# Patient Record
Sex: FEMALE | Race: WHITE | NOT HISPANIC OR LATINO
[De-identification: names, ages, dates, MRNs, and addresses within clinical notes are randomized per-mention and may not be internally consistent; named-entity substitution may affect disease eponyms.]

---
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
## 2023-03-28 ENCOUNTER — APPOINTMENT (OUTPATIENT)
Dept: PEDIATRICS | Facility: CLINIC | Age: 18
End: 2023-03-28
Payer: COMMERCIAL

## 2023-03-28 VITALS
BODY MASS INDEX: 23.81 KG/M2 | OXYGEN SATURATION: 98 % | WEIGHT: 141.2 LBS | DIASTOLIC BLOOD PRESSURE: 66 MMHG | HEIGHT: 64.75 IN | HEART RATE: 85 BPM | SYSTOLIC BLOOD PRESSURE: 104 MMHG

## 2023-03-28 DIAGNOSIS — Z13.29 ENCOUNTER FOR SCREENING FOR OTHER SUSPECTED ENDOCRINE DISORDER: ICD-10-CM

## 2023-03-28 DIAGNOSIS — Z00.00 ENCOUNTER FOR GENERAL ADULT MEDICAL EXAMINATION W/OUT ABNORMAL FINDINGS: ICD-10-CM

## 2023-03-28 DIAGNOSIS — J45.909 UNSPECIFIED ASTHMA, UNCOMPLICATED: ICD-10-CM

## 2023-03-28 PROCEDURE — 96127 BRIEF EMOTIONAL/BEHAV ASSMT: CPT

## 2023-03-28 PROCEDURE — 99173 VISUAL ACUITY SCREEN: CPT | Mod: 59

## 2023-03-28 PROCEDURE — 92551 PURE TONE HEARING TEST AIR: CPT

## 2023-03-28 PROCEDURE — 99395 PREV VISIT EST AGE 18-39: CPT | Mod: 25

## 2023-03-28 PROCEDURE — 96160 PT-FOCUSED HLTH RISK ASSMT: CPT | Mod: 59

## 2023-03-28 RX ORDER — FLUTICASONE PROPIONATE 44 UG/1
44 AEROSOL, METERED RESPIRATORY (INHALATION)
Qty: 1 | Refills: 2 | Status: ACTIVE | COMMUNITY
Start: 2023-03-28 | End: 1900-01-01

## 2023-03-28 RX ORDER — ALBUTEROL SULFATE 2.5 MG/3ML
(2.5 MG/3ML) SOLUTION RESPIRATORY (INHALATION)
Qty: 2 | Refills: 1 | Status: ACTIVE | COMMUNITY
Start: 2023-03-28 | End: 1900-01-01

## 2023-03-28 RX ORDER — ALBUTEROL SULFATE 90 UG/1
108 (90 BASE) INHALANT RESPIRATORY (INHALATION)
Qty: 1 | Refills: 2 | Status: ACTIVE | COMMUNITY
Start: 2023-03-28 | End: 1900-01-01

## 2023-03-29 PROBLEM — Z00.00 ENCOUNTER FOR PREVENTIVE HEALTH EXAMINATION: Status: ACTIVE | Noted: 2019-08-13

## 2023-03-29 PROBLEM — J45.909 ASTHMA: Status: ACTIVE | Noted: 2019-10-06

## 2023-03-29 RX ORDER — FLUTICASONE PROPIONATE 50 UG/1
50 SPRAY, METERED NASAL TWICE DAILY
Qty: 1 | Refills: 2 | Status: COMPLETED | COMMUNITY
Start: 2019-10-06 | End: 2023-03-29

## 2023-03-29 RX ORDER — DESOGESTREL AND ETHINYL ESTRADIOL 0.15-0.03
0.15-3 KIT ORAL
Qty: 28 | Refills: 0 | Status: COMPLETED | COMMUNITY
Start: 2019-05-28 | End: 2023-03-29

## 2023-03-29 RX ORDER — TRETINOIN 0.25 MG/G
0.03 CREAM TOPICAL
Qty: 45 | Refills: 0 | Status: COMPLETED | COMMUNITY
Start: 2019-01-22 | End: 2023-03-29

## 2023-03-29 RX ORDER — ALBUTEROL SULFATE 108 UG/1
108 (90 BASE) AEROSOL, METERED RESPIRATORY (INHALATION)
Qty: 1 | Refills: 2 | Status: COMPLETED | COMMUNITY
Start: 2019-10-06 | End: 2023-03-29

## 2023-03-29 NOTE — HISTORY OF PRESENT ILLNESS
[Mother] : mother [Yes] : Patient goes to dentist yearly [Up to date] : Up to date [No] : No cigarette smoke exposure [de-identified] : here self [FreeTextEntry7] : 17 yr well [FreeTextEntry1] : MELISSA  is here for 17 year  well child visit[\par \par Menstruation: LMP: . regular, not followed by gyn\par Appetite: good no veggies takes vitamin\par Drugs: does not use electronic nicotine delivery system, does not use tobacco, does not use drugs, does not drink alcohol. \par Safety: uses safety belts/safety equipment . \par Patient is doing well at home.\par Past medical history reviewed.\par Immunizations up to date\par Oral: , routine dental visits\par Behavior/ Activity: exercises 1 hour per day\par Safety: appropriately restrained in motor vehicle\par Sleeping: no concerns\par Urination and bowel moments normal\par working full time \par Parent(s) have current concerns or issues: doing well\par using some type of vape  with something for anxiety from [employer]\par does not feel needs to see therapist\par history of asthma recently with change of weather  using albuterol 5 times a week, sometimes with exercise\par not usually use that often, no allergy symptoms\par needs refill meds\par has spacer at home not use\par

## 2023-03-29 NOTE — RISK ASSESSMENT
[No Increased risk of SCA or SCD] : No Increased risk of SCA or SCD    [Have you ever had exercise-related chest pain or shortness of breath?] : Have you ever had exercise-related chest pain or shortness of breath? No [Has anyone in your immediate family (parents, grandparents, siblings) or other more distant relatives (aunts, uncles, cousins)  of heart] : Has anyone in your immediate family (parents, grandparents, siblings) or other more distant relatives (aunts, uncles, cousins)  of heart problems or had an unexpected sudden death before age 50 (This would include unexpected drownings, unexplained car accidents in which the relative was driving or sudden infant death syndrome.)? No [Are you related to anyone with hypertrophic cardiomyopathy or hypertrophic obstructive cardiomyopathy, Marfan syndrome, arrhythmogenic] : Are you related to anyone with hypertrophic cardiomyopathy or hypertrophic obstructive cardiomyopathy, Marfan syndrome, arrhythmogenic right ventricular cardiomyopathy, long QT syndrome, short QT syndrome, Brugada syndrome or catecholaminergic polymorphic ventricular tachycardia, or anyone younger than 50 years with a pacemaker or implantable defibrillator? No

## 2023-03-29 NOTE — DISCUSSION/SUMMARY
[Met privately with the adolescent for part of the office visit?] : Met privately with the adolescent for part of the office visit? Yes [Adolescent demonstrates understanding of his/her conditions and how to take prescribed medications?] : Adolescent demonstrates understanding of his/her conditions and how to take prescribed medications? Yes [Adolescent asks questions during each office  visit and participates in the care plan?] : Adolescent asks questions during each office visit and participates in the care plan? Yes [FreeTextEntry1] : declines Audiologist for hearing evaluation, patient declines working with loud noise\par HIPAA done\par COUNSELING/EDUCATION\par Nutritional counseling: Increase vegetables and fruits\par Reviewed  5-2-1-0 Healthy Habits Questionnaire\par \par Cardiac screening is negative\par bexsero discussed\par CARE COORDINATION  reviewed\par  Immunizations reviewed\par not recommend re vaping with history of asthma\par discussed albuterol hfa use with spacer, given rx for lfovent to start daily if using albuterol 5 times per week\par \par \par \par The following 15 to 21 year anticipatory guidance topics were discussed and/or handouts given: physical growth and development, social and academic competence, emotional well-being, risk reduction  and  injury prevention. Counseling for nutrition and physical activity was provided. \par \par Information discussed with patient .\par I have examined the above-named student and completed the preparticipation physical evaluation. The patient does not present apparent clinical contraindications to practice and participate in sport(s) as outlined above. If conditions arise after the athlete has been cleared for participation, the physician may rescind the clearance until the problem is resolved and the potential consequences are completely explained to the athlete (and parents/guardians).\par \par \par \par Follow up in one year.\par \par

## 2024-03-01 ENCOUNTER — RX RENEWAL (OUTPATIENT)
Age: 19
End: 2024-03-01

## 2024-03-01 RX ORDER — ALBUTEROL SULFATE 90 UG/1
108 (90 BASE) INHALANT RESPIRATORY (INHALATION)
Qty: 1 | Refills: 3 | Status: ACTIVE | COMMUNITY
Start: 2019-10-07 | End: 1900-01-01

## 2025-04-12 ENCOUNTER — NON-APPOINTMENT (OUTPATIENT)
Age: 20
End: 2025-04-12